# Patient Record
Sex: MALE | Race: BLACK OR AFRICAN AMERICAN | Employment: FULL TIME | URBAN - METROPOLITAN AREA
[De-identification: names, ages, dates, MRNs, and addresses within clinical notes are randomized per-mention and may not be internally consistent; named-entity substitution may affect disease eponyms.]

---

## 2019-02-01 ENCOUNTER — HOSPITAL ENCOUNTER (EMERGENCY)
Age: 34
Discharge: HOME | End: 2019-02-01
Attending: EMERGENCY MEDICINE
Payer: COMMERCIAL

## 2019-02-01 VITALS
SYSTOLIC BLOOD PRESSURE: 138 MMHG | TEMPERATURE: 98.7 F | OXYGEN SATURATION: 100 % | HEIGHT: 66 IN | BODY MASS INDEX: 36.96 KG/M2 | DIASTOLIC BLOOD PRESSURE: 78 MMHG | WEIGHT: 230 LBS | HEART RATE: 103 BPM | RESPIRATION RATE: 18 BRPM

## 2019-02-01 DIAGNOSIS — R19.7 DIARRHEA, UNSPECIFIED TYPE: ICD-10-CM

## 2019-02-01 DIAGNOSIS — R11.2 NAUSEA AND VOMITING, INTRACTABILITY OF VOMITING NOT SPECIFIED, UNSPECIFIED VOMITING TYPE: Primary | ICD-10-CM

## 2019-02-01 LAB
APPEARANCE UR: CLEAR
BILIRUB UR QL: NEGATIVE
COLOR UR: YELLOW
GLUCOSE UR STRIP.AUTO-MCNC: NEGATIVE MG/DL
HGB UR QL STRIP: NEGATIVE
KETONES UR QL STRIP.AUTO: NEGATIVE MG/DL
LEUKOCYTE ESTERASE UR QL STRIP.AUTO: NEGATIVE
NITRITE UR QL STRIP.AUTO: NEGATIVE
PH UR STRIP: 6 [PH] (ref 5–8)
PROT UR STRIP-MCNC: NEGATIVE MG/DL
SP GR UR REFRACTOMETRY: 1.03 (ref 1–1.03)
UROBILINOGEN UR QL STRIP.AUTO: 1 EU/DL (ref 0.2–1)

## 2019-02-01 PROCEDURE — 81003 URINALYSIS AUTO W/O SCOPE: CPT

## 2019-02-01 PROCEDURE — 74011250637 HC RX REV CODE- 250/637: Performed by: EMERGENCY MEDICINE

## 2019-02-01 PROCEDURE — 96374 THER/PROPH/DIAG INJ IV PUSH: CPT

## 2019-02-01 PROCEDURE — 74011250636 HC RX REV CODE- 250/636: Performed by: EMERGENCY MEDICINE

## 2019-02-01 PROCEDURE — 99283 EMERGENCY DEPT VISIT LOW MDM: CPT

## 2019-02-01 PROCEDURE — 96361 HYDRATE IV INFUSION ADD-ON: CPT

## 2019-02-01 RX ORDER — ACETAMINOPHEN 500 MG
1000 TABLET ORAL ONCE
Status: COMPLETED | OUTPATIENT
Start: 2019-02-01 | End: 2019-02-01

## 2019-02-01 RX ORDER — ONDANSETRON 4 MG/1
4 TABLET, FILM COATED ORAL
Qty: 12 TAB | Refills: 0 | Status: SHIPPED | OUTPATIENT
Start: 2019-02-01

## 2019-02-01 RX ORDER — ONDANSETRON 2 MG/ML
4 INJECTION INTRAMUSCULAR; INTRAVENOUS
Status: COMPLETED | OUTPATIENT
Start: 2019-02-01 | End: 2019-02-01

## 2019-02-01 RX ADMIN — ACETAMINOPHEN 1000 MG: 500 TABLET, FILM COATED ORAL at 16:05

## 2019-02-01 RX ADMIN — ONDANSETRON 4 MG: 2 INJECTION INTRAMUSCULAR; INTRAVENOUS at 14:36

## 2019-02-01 RX ADMIN — SODIUM CHLORIDE 1000 ML: 900 INJECTION, SOLUTION INTRAVENOUS at 14:36

## 2019-02-01 RX ADMIN — SODIUM CHLORIDE 500 ML: 900 INJECTION, SOLUTION INTRAVENOUS at 16:06

## 2019-02-01 NOTE — ED PROVIDER NOTES
EMERGENCY DEPARTMENT HISTORY AND PHYSICAL EXAM 
 
Date: 2/1/2019 Patient Name: Katrin Moran History of Presenting Illness Chief Complaint Patient presents with  Vomiting  Abdominal Pain History Provided By: Patient Chief Complaint: abd pain Duration: since yesterday Timing:  Gradual and Worsening Location: diffuse abdomen Quality: cramping and throbbing Severity: 8 out of 10 Associated Symptoms: N/V/D Additional History (Context):  
2:00 PM 
Katrin Moran is a 27 y.o. male who presents to the emergency department C/O gradually worsening cramping throbbing abd pain onset yesterday. Associated sxs include N/V/D (6 episodes vomiting). Diarrhea started 2 days ago. Pain still persists after vomiting. NKDA. No PMHx. PSHx of testicular surgery. Pt denies fever, testicular pain, penile d/c, trauma, eating strange meals, hematuria, and any other sxs or complaints. PCP: None Past History Past Medical History: 
History reviewed. No pertinent past medical history. Past Surgical History: 
History reviewed. No pertinent surgical history. Family History: 
History reviewed. No pertinent family history. Social History: 
Social History Tobacco Use  Smoking status: Never Smoker  Smokeless tobacco: Never Used Substance Use Topics  Alcohol use: No  
  Frequency: Never  Drug use: Not on file Allergies: 
No Known Allergies Review of Systems Review of Systems Constitutional: Negative for fever. Gastrointestinal: Positive for abdominal pain, diarrhea, nausea and vomiting. Genitourinary: Negative for discharge, hematuria and testicular pain. All other systems reviewed and are negative. Physical Exam  
 
Vitals:  
 02/01/19 1339 02/01/19 1548 BP: (!) 151/92 138/78 Pulse: (!) 109 (!) 103 Resp: 20 18 Temp: 97.9 °F (36.6 °C) 98.7 °F (37.1 °C) SpO2: 100% 100% Weight: 104.3 kg (230 lb) Height: 5' 6\" (1.676 m) Physical Exam  
Nursing note and vitals reviewed. Constitutional: Well appearing, no acute distress Head: Normocephalic, Atraumatic Eyes: Pupils are equal, round, and reactive to light, EOMI Neck: Supple, non-tender Cardiovascular: Regular rate and rhythm, no murmurs, rubs, or gallops Chest: Normal work of breathing and chest excursion bilaterally Lungs: Clear to auscultation bilaterally Abdomen: Soft, mild tenderness in all 4 quadrants of abdomen without rebound or guarding, non distended Back: No evidence of trauma or deformity Extremities: No evidence of trauma or deformity, no LE edema Skin: Warm and dry, normal cap refill Neuro: Alert and appropriate Psychiatric: Normal mood and affect Diagnostic Study Results Labs - Recent Results (from the past 12 hour(s)) URINALYSIS W/ RFLX MICROSCOPIC Collection Time: 02/01/19  2:40 PM  
Result Value Ref Range Color YELLOW Appearance CLEAR Specific gravity 1.028 1.005 - 1.030    
 pH (UA) 6.0 5.0 - 8.0 Protein NEGATIVE  NEG mg/dL Glucose NEGATIVE  NEG mg/dL Ketone NEGATIVE  NEG mg/dL Bilirubin NEGATIVE  NEG Blood NEGATIVE  NEG Urobilinogen 1.0 0.2 - 1.0 EU/dL Nitrites NEGATIVE  NEG Leukocyte Esterase NEGATIVE  NEG Radiologic Studies - No orders to display CT Results  (Last 48 hours) None CXR Results  (Last 48 hours) None Medications given in the ED- Medications  
sodium chloride 0.9 % bolus infusion 500 mL (500 mL IntraVENous New Bag 2/1/19 1606)  
sodium chloride 0.9 % bolus infusion 1,000 mL (0 mL IntraVENous IV Completed 2/1/19 1532) ondansetron [facility] injection 4 mg (4 mg IntraVENous Given 2/1/19 1436)  
acetaminophen (TYLENOL) tablet 1,000 mg (1,000 mg Oral Given 2/1/19 1605) Medical Decision Making I am the first provider for this patient.  
 
I reviewed the vital signs, available nursing notes, past medical history, past surgical history, family history and social history. Vital Signs-Reviewed the patient's vital signs. Pulse Oximetry Analysis- 100% on room air. Records Reviewed: Nursing Notes and Old Medical Records Provider Notes (Medical Decision Making): 27year old male with PMHx as above who presented today with N/V/D. Recent sick contact as child with same sxs. On exam, he had a crampy tender abdomen with no evidence of rebound/guarding, specifically no McBurney's point TTP. Treated with Zofran and Fluids. Able to tolerate PO intake. UA showed no evidence of infection. Pt will f/u with his PCM in 48-72 hours. Pt and wife understood and were in agreement with the plan. Procedures: 
Procedures ED Course:  
2:00 PM Initial assessment performed. The patients presenting problems have been discussed, and they are in agreement with the care plan formulated and outlined with them. I have encouraged them to ask questions as they arise throughout their visit. Diagnosis and Disposition DISCHARGE NOTE: 
4:17 PM 
Chelsie Mcdonald's  results have been reviewed with him. He has been counseled regarding his diagnosis, treatment, and plan. He verbally conveys understanding and agreement of the signs, symptoms, diagnosis, treatment and prognosis and additionally agrees to follow up as discussed. He also agrees with the care-plan and conveys that all of his questions have been answered. I have also provided discharge instructions for him that include: educational information regarding their diagnosis and treatment, and list of reasons why they would want to return to the ED prior to their follow-up appointment, should his condition change. He has been provided with education for proper emergency department utilization. CLINICAL IMPRESSION: 
 
1. Nausea and vomiting, intractability of vomiting not specified, unspecified vomiting type 2. Diarrhea, unspecified type PLAN: 
1. D/C Home 2. Current Discharge Medication List  
  
START taking these medications Details  
ondansetron hcl (ZOFRAN) 4 mg tablet Take 1 Tab by mouth every eight (8) hours as needed for Nausea. Qty: 12 Tab, Refills: 0  
  
  
 
3. Follow-up Information Follow up With Specialties Details Why Contact Rodo Vera DO Internal Medicine Schedule an appointment as soon as possible for a visit For Primary Care Follow Up [address] [place] [address] 
[phone] [facility] EMERGENCY DEPT Emergency Medicine Go to If symptoms worsen, As needed [address] 
[address] [address] 
[phone]  
  
 
_______________________________ Attestations: This note is prepared by Penny Garcia, acting as Scribe for Kedar Covarrubias MD. 
 
Kedar Covarrubias MD:  The scribe's documentation has been prepared under my direction and personally reviewed by me in its entirety. I confirm that the note above accurately reflects all work, treatment, procedures, and medical decision making performed by me. 
_______________________________

## 2019-02-01 NOTE — LETTER
[facility] [place] 
[facility] 
[address] [id number] [phone] Work Note Date: 2/1/2019 To Whom It May concern: Tana Trinidad was seen and treated today in the emergency room by the following provider(s): 
Attending Provider: Anitra Haq MD. Please excuse missed work. Tana Trinidad may return to work on 2/3/2019.  
 
Sincerely, 
 
 
 
 
Nicolas Mitchell MD